# Patient Record
Sex: FEMALE | Race: ASIAN | NOT HISPANIC OR LATINO | Employment: UNEMPLOYED | URBAN - METROPOLITAN AREA
[De-identification: names, ages, dates, MRNs, and addresses within clinical notes are randomized per-mention and may not be internally consistent; named-entity substitution may affect disease eponyms.]

---

## 2023-10-27 ENCOUNTER — HOSPITAL ENCOUNTER (EMERGENCY)
Facility: HOSPITAL | Age: 53
Discharge: HOME | End: 2023-10-28
Attending: EMERGENCY MEDICINE

## 2023-10-27 ENCOUNTER — APPOINTMENT (EMERGENCY)
Dept: CT IMAGING | Facility: HOSPITAL | Age: 53
End: 2023-10-27

## 2023-10-27 VITALS
SYSTOLIC BLOOD PRESSURE: 168 MMHG | RESPIRATION RATE: 22 BRPM | HEART RATE: 72 BPM | TEMPERATURE: 98.2 F | OXYGEN SATURATION: 100 % | DIASTOLIC BLOOD PRESSURE: 84 MMHG | WEIGHT: 221.12 LBS

## 2023-10-27 DIAGNOSIS — R51.9 HEADACHE: ICD-10-CM

## 2023-10-27 DIAGNOSIS — R42 DIZZINESS: ICD-10-CM

## 2023-10-27 DIAGNOSIS — G44.59 COMPLICATED HEADACHE SYNDROMES: Primary | ICD-10-CM

## 2023-10-27 DIAGNOSIS — E03.9 HYPOTHYROID: ICD-10-CM

## 2023-10-27 LAB
ALBUMIN SERPL BCP-MCNC: 4.1 G/DL (ref 3.5–5)
ALP SERPL-CCNC: 64 U/L (ref 34–104)
ALT SERPL W P-5'-P-CCNC: 11 U/L (ref 7–52)
ANION GAP SERPL CALCULATED.3IONS-SCNC: 6 MMOL/L
APTT PPP: 32 SECONDS (ref 23–37)
AST SERPL W P-5'-P-CCNC: 16 U/L (ref 13–39)
BASOPHILS # BLD AUTO: 0.05 THOUSANDS/ÂΜL (ref 0–0.1)
BASOPHILS NFR BLD AUTO: 1 % (ref 0–1)
BILIRUB SERPL-MCNC: 0.35 MG/DL (ref 0.2–1)
BUN SERPL-MCNC: 11 MG/DL (ref 5–25)
CALCIUM SERPL-MCNC: 8.9 MG/DL (ref 8.4–10.2)
CARDIAC TROPONIN I PNL SERPL HS: <2 NG/L
CHLORIDE SERPL-SCNC: 101 MMOL/L (ref 96–108)
CO2 SERPL-SCNC: 30 MMOL/L (ref 21–32)
CREAT SERPL-MCNC: 0.86 MG/DL (ref 0.6–1.3)
EOSINOPHIL # BLD AUTO: 0.14 THOUSAND/ÂΜL (ref 0–0.61)
EOSINOPHIL NFR BLD AUTO: 2 % (ref 0–6)
ERYTHROCYTE [DISTWIDTH] IN BLOOD BY AUTOMATED COUNT: 12.7 % (ref 11.6–15.1)
GFR SERPL CREATININE-BSD FRML MDRD: 76 ML/MIN/1.73SQ M
GLUCOSE SERPL-MCNC: 129 MG/DL (ref 65–140)
HCT VFR BLD AUTO: 34.5 % (ref 34.8–46.1)
HGB BLD-MCNC: 10.9 G/DL (ref 11.5–15.4)
IMM GRANULOCYTES # BLD AUTO: 0.02 THOUSAND/UL (ref 0–0.2)
IMM GRANULOCYTES NFR BLD AUTO: 0 % (ref 0–2)
INR PPP: 0.93 (ref 0.84–1.19)
LYMPHOCYTES # BLD AUTO: 3.13 THOUSANDS/ÂΜL (ref 0.6–4.47)
LYMPHOCYTES NFR BLD AUTO: 38 % (ref 14–44)
MAGNESIUM SERPL-MCNC: 2.1 MG/DL (ref 1.9–2.7)
MCH RBC QN AUTO: 29.5 PG (ref 26.8–34.3)
MCHC RBC AUTO-ENTMCNC: 31.6 G/DL (ref 31.4–37.4)
MCV RBC AUTO: 94 FL (ref 82–98)
MONOCYTES # BLD AUTO: 0.65 THOUSAND/ÂΜL (ref 0.17–1.22)
MONOCYTES NFR BLD AUTO: 8 % (ref 4–12)
NEUTROPHILS # BLD AUTO: 4.24 THOUSANDS/ÂΜL (ref 1.85–7.62)
NEUTS SEG NFR BLD AUTO: 51 % (ref 43–75)
NRBC BLD AUTO-RTO: 0 /100 WBCS
PLATELET # BLD AUTO: 303 THOUSANDS/UL (ref 149–390)
PMV BLD AUTO: 9.9 FL (ref 8.9–12.7)
POTASSIUM SERPL-SCNC: 3.5 MMOL/L (ref 3.5–5.3)
PROT SERPL-MCNC: 7 G/DL (ref 6.4–8.4)
PROTHROMBIN TIME: 13.1 SECONDS (ref 11.6–14.5)
RBC # BLD AUTO: 3.69 MILLION/UL (ref 3.81–5.12)
SODIUM SERPL-SCNC: 137 MMOL/L (ref 135–147)
WBC # BLD AUTO: 8.23 THOUSAND/UL (ref 4.31–10.16)

## 2023-10-27 PROCEDURE — 99285 EMERGENCY DEPT VISIT HI MDM: CPT | Performed by: EMERGENCY MEDICINE

## 2023-10-27 PROCEDURE — 99284 EMERGENCY DEPT VISIT MOD MDM: CPT

## 2023-10-27 PROCEDURE — 84484 ASSAY OF TROPONIN QUANT: CPT

## 2023-10-27 PROCEDURE — 93005 ELECTROCARDIOGRAM TRACING: CPT

## 2023-10-27 PROCEDURE — 83880 ASSAY OF NATRIURETIC PEPTIDE: CPT

## 2023-10-27 PROCEDURE — 36415 COLL VENOUS BLD VENIPUNCTURE: CPT

## 2023-10-27 PROCEDURE — 85025 COMPLETE CBC W/AUTO DIFF WBC: CPT

## 2023-10-27 PROCEDURE — 83735 ASSAY OF MAGNESIUM: CPT

## 2023-10-27 PROCEDURE — 85730 THROMBOPLASTIN TIME PARTIAL: CPT

## 2023-10-27 PROCEDURE — 80053 COMPREHEN METABOLIC PANEL: CPT

## 2023-10-27 PROCEDURE — 81001 URINALYSIS AUTO W/SCOPE: CPT

## 2023-10-27 PROCEDURE — 70498 CT ANGIOGRAPHY NECK: CPT

## 2023-10-27 PROCEDURE — G1004 CDSM NDSC: HCPCS

## 2023-10-27 PROCEDURE — 85610 PROTHROMBIN TIME: CPT

## 2023-10-27 PROCEDURE — 84439 ASSAY OF FREE THYROXINE: CPT

## 2023-10-27 PROCEDURE — 96374 THER/PROPH/DIAG INJ IV PUSH: CPT

## 2023-10-27 PROCEDURE — 84443 ASSAY THYROID STIM HORMONE: CPT

## 2023-10-27 PROCEDURE — 70496 CT ANGIOGRAPHY HEAD: CPT

## 2023-10-27 RX ORDER — ONDANSETRON 2 MG/ML
4 INJECTION INTRAMUSCULAR; INTRAVENOUS ONCE
Status: COMPLETED | OUTPATIENT
Start: 2023-10-27 | End: 2023-10-27

## 2023-10-27 RX ADMIN — IOHEXOL 85 ML: 350 INJECTION, SOLUTION INTRAVENOUS at 23:19

## 2023-10-27 RX ADMIN — ONDANSETRON 4 MG: 2 INJECTION INTRAMUSCULAR; INTRAVENOUS at 23:05

## 2023-10-28 LAB
BACTERIA UR QL AUTO: ABNORMAL /HPF
BILIRUB UR QL STRIP: NEGATIVE
BNP SERPL-MCNC: 11 PG/ML (ref 0–100)
CLARITY UR: CLEAR
COLOR UR: COLORLESS
GLUCOSE UR STRIP-MCNC: NEGATIVE MG/DL
HGB UR QL STRIP.AUTO: NEGATIVE
KETONES UR STRIP-MCNC: NEGATIVE MG/DL
LEUKOCYTE ESTERASE UR QL STRIP: ABNORMAL
NITRITE UR QL STRIP: NEGATIVE
NON-SQ EPI CELLS URNS QL MICRO: ABNORMAL /HPF
PH UR STRIP.AUTO: 7.5 [PH]
PROT UR STRIP-MCNC: NEGATIVE MG/DL
RBC #/AREA URNS AUTO: ABNORMAL /HPF
SP GR UR STRIP.AUTO: 1.02 (ref 1–1.03)
T4 FREE SERPL-MCNC: 0.73 NG/DL (ref 0.61–1.12)
TSH SERPL DL<=0.05 MIU/L-ACNC: 45.53 UIU/ML (ref 0.45–4.5)
UROBILINOGEN UR STRIP-ACNC: <2 MG/DL
WBC #/AREA URNS AUTO: ABNORMAL /HPF

## 2023-10-28 RX ORDER — MECLIZINE HYDROCHLORIDE 25 MG/1
25 TABLET ORAL 3 TIMES DAILY PRN
Qty: 30 TABLET | Refills: 0 | Status: SHIPPED | OUTPATIENT
Start: 2023-10-28

## 2023-10-28 RX ORDER — MECLIZINE HCL 12.5 MG/1
12.5 TABLET ORAL ONCE
Status: COMPLETED | OUTPATIENT
Start: 2023-10-28 | End: 2023-10-28

## 2023-10-28 RX ADMIN — MECLIZINE HYDROCHLORIDE 12.5 MG: 12.5 TABLET ORAL at 00:06

## 2023-10-28 NOTE — ED ATTENDING ATTESTATION
10/27/2023  IDarcie, DO, saw and evaluated the patient. I have discussed the patient with the resident/non-physician practitioner and agree with the resident's/non-physician practitioner's findings, Plan of Care, and MDM as documented in the resident's/non-physician practitioner's note, except where noted. All available labs and Radiology studies were reviewed. I was present for key portions of any procedure(s) performed by the resident/non-physician practitioner and I was immediately available to provide assistance. At this point I agree with the current assessment done in the Emergency Department. I have conducted an independent evaluation of this patient a history and physical is as follows:        49-year-old female presents with sudden onset left-sided headache.,  Says she was laying down resting when she noticed a sudden onset left-sided headache, started 2 hours ago, maximal intensity at time of onset. Similar headaches 7 months ago.,  This occurred in [facility], says she had a work-up but unsure exactly what it was had some sort of imaging unclear if it was MRI or CT.,  No associated nausea no vomiting.,  Currently now still has a mild headache but improved from what it was      Review of Systems   Constitutional: Negative for activity change, chills, diaphoresis and fever. HENT: Negative for congestion, sinus pressure and sore throat. Eyes: Negative for pain and visual disturbance. Respiratory: Negative for cough, chest tightness, shortness of breath, wheezing and stridor. Cardiovascular: Negative for chest pain and palpitations. Gastrointestinal: Negative for abdominal distention, abdominal pain, constipation, diarrhea, nausea and vomiting. Genitourinary: Negative for dysuria and frequency. Musculoskeletal: Negative for neck pain and neck stiffness. Skin: Negative for rash.    Neurological: Negative for dizziness, speech difficulty, light-headedness, numbness positive for headaches. Physical Exam  Vitals reviewed. Constitutional:       General: She is not in acute distress. Appearance: She is well-developed. She is not diaphoretic. HENT:      Head: Normocephalic and atraumatic. Right Ear: External ear normal.      Left Ear: External ear normal.      Nose: Nose normal.   Eyes:      General:         Right eye: No discharge. Left eye: No discharge. Pupils: Pupils are equal, round, and reactive to light. Neck:      Trachea: No tracheal deviation. Cardiovascular:      Rate and Rhythm: Normal rate and regular rhythm. Heart sounds: Normal heart sounds. No murmur heard. Pulmonary:      Effort: Pulmonary effort is normal. No respiratory distress. Breath sounds: Normal breath sounds. No stridor. Abdominal:      General: There is no distension. Palpations: Abdomen is soft. Tenderness: There is no abdominal tenderness. There is no guarding or rebound. Musculoskeletal:         General: Normal range of motion. Cervical back: Normal range of motion and neck supple. Skin:     General: Skin is warm and dry. Coloration: Skin is not pale. Findings: No erythema. Neurological:      General: No focal deficit present. Mental Status: She is alert and oriented to person, place, and time. ED Course         Critical Care Time  Procedures      MDM  Number of Diagnoses or Management Options  Complicated headache syndromes  Dizziness  Headache  Hypothyroid  Diagnosis management comments:       Initial ED assessment:     58-year-old female sudden onset headache.     Initial DDx includes but is not limited to:   Subarachnoid hemorrhage, migraine headache, tension headache,    Initial ED plan:   Blood work, CT head        Final ED summary/disposition:   After evaluation and workup in the emergency department, patient with an elevated TSH known hypothyroidism likely needs adjustment of her thyroid medication, most of her care has been in [facility], will arrange with family doctor is here             Time reflects when diagnosis was documented in both MDM as applicable and the Disposition within this note       Time User Action Codes Description Comment    10/28/2023  1:09 AM Pavan Paul Add [R51.9] Headache     10/28/2023  1:09 AM Pavan Paul Add [E03.9] Hypothyroid     10/28/2023  1:09 AM Pavan Paul Add [R42] Dizziness     10/28/2023  1:54 AM Armando BLANCO Add [F21.39] Complicated headache syndromes     10/28/2023  1:54 AM Chandrika Mosley Modify [R51.9] Headache     10/28/2023  1:54 AM Chandrika Mosley Modify [C43.21] Complicated headache syndromes           ED Disposition       ED Disposition   Discharge    Condition   Stable    Date/Time   Sat Oct 28, 2023  1:09 AM    Comment   Diane Perkins discharge to home/self care.                    Follow-up Information       Follow up With Specialties Details Why Contact Info Additional Information    [facility] Schedule an appointment as soon as possible for a visit   [address] [place] [phone]  [address] [place] [address] Radha Mart[address]   [phone]

## 2023-10-28 NOTE — ED PROVIDER NOTES
History  Chief Complaint   Patient presents with    Headache     Pt reports sudden onset of 10/10 HA and blurred vision around 10pm. Pain is in temples and radiates into jaw. Denies CP/SOB. Reports upper abdominal pain. Denies urinary problems      57-year-old female history of high blood pressure and hypothyroidism presenting with sudden severe headache. Started about 1.5 hours ago with 10/10 headache with sudden severe intensity. Pain behind eyes, back of head, and in the jaw. Some nausea without vomiting. Slight shortness of breath. Has had a similar headache before, about 8 months ago, evaluated in [facility] with a head scan which was apparently negative. No neuro sxs, cp, sob, vomiting. Has not tried anything to make feel better. HPI and PE limited by language barrier.  used: Yes ([facility]  Benjie [id number] and pt's friend)    Headache  Associated symptoms: no abdominal pain, no cough, no diarrhea, no fever, no nausea and no vomiting        None       No past medical history on file. No past surgical history on file. No family history on file. I have reviewed and agree with the history as documented. No existing history information found. No existing history information found. Review of Systems   Constitutional:  Negative for activity change, fever and unexpected weight change. Respiratory:  Negative for cough, chest tightness and shortness of breath. Cardiovascular:  Negative for chest pain and palpitations. Gastrointestinal:  Negative for abdominal pain, diarrhea, nausea and vomiting. Genitourinary:  Negative for dysuria and hematuria. Skin:  Negative for wound. Allergic/Immunologic: Negative for immunocompromised state. Neurological:  Positive for headaches. Negative for syncope. All other systems reviewed and are negative.       Physical Exam  ED Triage Vitals   Temperature Pulse Respirations Blood Pressure SpO2   10/27/23 2240 10/27/23 2238 10/27/23 2238 10/27/23 2238 10/27/23 2238   98.2 °F (36.8 °C) 72 22 168/84 100 %      Temp Source Heart Rate Source Patient Position - Orthostatic VS BP Location FiO2 (%)   10/27/23 2240 10/27/23 2238 -- -- --   Oral Monitor         Pain Score       10/27/23 2238       10 - Worst Possible Pain             Orthostatic Vital Signs  Vitals:    10/27/23 2238   BP: 168/84   Pulse: 72       Physical Exam  Vitals and nursing note reviewed. Constitutional:       General: She is in acute distress. Appearance: She is not toxic-appearing or diaphoretic. HENT:      Head: Normocephalic and atraumatic. Right Ear: External ear normal.      Left Ear: External ear normal.      Nose: Nose normal.      Mouth/Throat:      Mouth: Mucous membranes are moist.   Eyes:      General: No scleral icterus. Extraocular Movements: Extraocular movements intact. Conjunctiva/sclera: Conjunctivae normal.   Cardiovascular:      Rate and Rhythm: Normal rate and regular rhythm. Pulses: Normal pulses. Radial pulses are 2+ on the right side. Dorsalis pedis pulses are 2+ on the right side and 2+ on the left side. Heart sounds: Normal heart sounds, S1 normal and S2 normal. No murmur heard. Pulmonary:      Effort: Pulmonary effort is normal. No respiratory distress. Breath sounds: Normal breath sounds. No stridor. No wheezing. Abdominal:      Palpations: Abdomen is soft. Tenderness: There is no abdominal tenderness. Musculoskeletal:         General: Normal range of motion. Cervical back: Normal range of motion. No rigidity. Skin:     General: Skin is warm and dry. Neurological:      General: No focal deficit present. Mental Status: She is alert and oriented to person, place, and time. Cranial Nerves: No facial asymmetry. Motor: Motor function is intact. No weakness or tremor.       Coordination: Finger-Nose-Finger Test and Heel to Aguilar Test normal. Rapid alternating movements normal.      Gait: Gait is intact. Psychiatric:         Mood and Affect: Mood normal.         ED Medications  Medications   ondansetron (ZOFRAN) injection 4 mg (4 mg Intravenous Given 10/27/23 2305)   iohexol (OMNIPAQUE) 350 MG/ML injection (MULTI-DOSE) 85 mL (85 mL Intravenous Given 10/27/23 2319)   meclizine (ANTIVERT) tablet 12.5 mg (12.5 mg Oral Given 10/28/23 0006)       Diagnostic Studies  Results Reviewed       Procedure Component Value Units Date/Time    Urine Microscopic [[id number]]  (Abnormal) Collected: 10/27/23 2355    Lab Status: Final result Specimen: Urine, Clean Catch Updated: 10/28/23 0015     RBC, UA None Seen /hpf      WBC, UA 4-10 /hpf      Epithelial Cells None Seen /hpf      Bacteria, UA None Seen /hpf     B-Type Natriuretic Peptide(BNP) [[id number]]  (Normal) Collected: 10/27/23 2306    Lab Status: Final result Specimen: Blood from Arm, Right Updated: 10/28/23 0012     BNP 11 pg/mL     TSH, 3rd generation with Free T4 reflex [[id number]]  (Abnormal) Collected: 10/27/23 2306    Lab Status: Final result Specimen: Blood from Arm, Right Updated: 10/28/23 0011     TSH 3RD GENERATON 45.529 uIU/mL     T4, free [[id number]] Collected: 10/27/23 2306    Lab Status:  In process Specimen: Blood from Arm, Right Updated: 10/28/23 0011    UA w Reflex to Microscopic w Reflex to Culture [[id number]]  (Abnormal) Collected: 10/27/23 2355    Lab Status: Final result Specimen: Urine, Clean Catch Updated: 10/28/23 0002     Color, UA Colorless     Clarity, UA Clear     Specific Gravity, UA 1.025     pH, UA 7.5     Leukocytes, UA Small     Nitrite, UA Negative     Protein, UA Negative mg/dl      Glucose, UA Negative mg/dl      Ketones, UA Negative mg/dl      Urobilinogen, UA <2.0 mg/dl      Bilirubin, UA Negative     Occult Blood, UA Negative    HS Troponin 0hr (reflex protocol) [[id number]]  (Normal) Collected: 10/27/23 2306    Lab Status: Final result Specimen: Blood from Arm, Right Updated: 10/27/23 2345 hs TnI 0hr <2 ng/L     Comprehensive metabolic panel [[id number]] Collected: 10/27/23 2306    Lab Status: Final result Specimen: Blood from Arm, Right Updated: 10/27/23 2336     Sodium 137 mmol/L      Potassium 3.5 mmol/L      Chloride 101 mmol/L      CO2 30 mmol/L      ANION GAP 6 mmol/L      BUN 11 mg/dL      Creatinine 0.86 mg/dL      Glucose 129 mg/dL      Calcium 8.9 mg/dL      AST 16 U/L      ALT 11 U/L      Alkaline Phosphatase 64 U/L      Total Protein 7.0 g/dL      Albumin 4.1 g/dL      Total Bilirubin 0.35 mg/dL      eGFR 76 ml/min/1.73sq m     Narrative:      [place] guidelines for Chronic Kidney Disease (CKD):     Stage 1 with normal or high GFR (GFR > 90 mL/min/1.73 square meters)    Stage 2 Mild CKD (GFR = 60-89 mL/min/1.73 square meters)    Stage 3A Moderate CKD (GFR = 45-59 mL/min/1.73 square meters)    Stage 3B Moderate CKD (GFR = 30-44 mL/min/1.73 square meters)    Stage 4 Severe CKD (GFR = 15-29 mL/min/1.73 square meters)    Stage 5 End Stage CKD (GFR <15 mL/min/1.73 square meters)  Note: GFR calculation is accurate only with a steady state creatinine    Magnesium [[id number]]  (Normal) Collected: 10/27/23 2306    Lab Status: Final result Specimen: Blood from Arm, Right Updated: 10/27/23 2336     Magnesium 2.1 mg/dL     Protime-INR [[id number]]  (Normal) Collected: 10/27/23 2306    Lab Status: Final result Specimen: Blood from Arm, Right Updated: 10/27/23 2332     Protime 13.1 seconds      INR 0.93    APTT [[id number]]  (Normal) Collected: 10/27/23 2306    Lab Status: Final result Specimen: Blood from Arm, Right Updated: 10/27/23 2332     PTT 32 seconds     CBC and differential [[id number]]  (Abnormal) Collected: 10/27/23 2306    Lab Status: Final result Specimen: Blood from Arm, Right Updated: 10/27/23 2316     WBC 8.23 Thousand/uL      RBC 3.69 Million/uL      Hemoglobin 10.9 g/dL      Hematocrit 34.5 %      MCV 94 fL      MCH 29.5 pg      MCHC 31.6 g/dL      RDW 12.7 % MPV 9.9 fL      Platelets 456 Thousands/uL      nRBC 0 /100 WBCs      Neutrophils Relative 51 %      Immat GRANS % 0 %      Lymphocytes Relative 38 %      Monocytes Relative 8 %      Eosinophils Relative 2 %      Basophils Relative 1 %      Neutrophils Absolute 4.24 Thousands/µL      Immature Grans Absolute 0.02 Thousand/uL      Lymphocytes Absolute 3.13 Thousands/µL      Monocytes Absolute 0.65 Thousand/µL      Eosinophils Absolute 0.14 Thousand/µL      Basophils Absolute 0.05 Thousands/µL                    CTA head and neck with and without contrast   Final Result by Korey Ferrer MD (10/28 4912)      No evidence of acute intracranial abnormality. No evidence of large vessel occlusion. No evidence of large vessel flow restrictive disease within the head or neck. Anatomic variations, as described above. Workstation performed: [id number]               Procedures  ECG 12 Lead Documentation Only    Date/Time: 10/28/2023 1:49 AM    Performed by: Javon Henderson MD  Authorized by: Javon Henderson MD    ECG reviewed by me, the ED Provider: yes    Patient location:  ED  Previous ECG:     Previous ECG:  Unavailable  Interpretation:     Interpretation: normal    Rate:     ECG rate:  67    ECG rate assessment: normal    Rhythm:     Rhythm: sinus rhythm    Ectopy:     Ectopy: none    QRS:     QRS axis:  Normal    QRS intervals:  Normal  Conduction:     Conduction: normal    ST segments:     ST segments:  Normal  T waves:     T waves: normal          ED Course  ED Course as of 10/28/23 0357   Fri Oct 27, 2023   2330 CBC and differential(!)  wnl   2339 Comprehensive metabolic panel  wnl   Sat Oct 28, 2023   0000 hs TnI 0hr: <2   0005 UA w Reflex to Microscopic w Reflex to Culture(!)  No uti   0014 TSH 3RD GENERATON(!): 45.529   0017 Urine Microscopic(!)  No uti   0042 CTA head and neck with and without contrast  No evidence of acute intracranial abnormality.      No evidence of large vessel occlusion. No evidence of large vessel flow restrictive disease within the head or neck. Anatomic variations, as described above. HEART score:    History 0=Slightly or non-suspicious   ECG 0=Normal   Age 1= > 45 - <65 years   Risk Factors 1= 1 or 2 risk factors   Troponin 0= Less than or equal to 12 ng/L   Total 2                                      Medical Decision Making  Initial Impression: Given sudden severe headache, worried about the possibility of subarachnoid hemorrhage. Although patient recounts having a similar headache a few months ago, some possibility that that could have been a herald bleed. No neurodeficits so do not suspect stroke. No fever or nuchal rigidity so do not suspect meningitis. Could be tension type headache or migraine. Will go directly to CTA to evaluate for the possibility of subarachnoid hemorrhage/aneurysm. We will also do an evaluation looking for signs of infection electrolyte abnormalities. Although not likely be related, will test her TSH as it does not seem like she gets regular evaluations of this      Final Impression: CTA head/neck was negative for bleed or any other acute pathology. Signs of acute infection. Patient was given Antivert for dizziness which did help her symptoms. The time the CTA was read, went to reevaluate patient who was feeling much better and was okay being discharged home. Did review with her and son the elevated TSH and spent a long time explaining the importance of follow-up and that she likely needs to increase her levothyroxine. They explained that patient goes back and forth between here in [facility] every few months which makes it hard to coordinate care. Provided patient with a referral to our family medicine clinic who can help reassess her current levothyroxine dose and see what changes need to be made. Since it helped her so much tonight, did provide her a short prescription for Antivert. Patient was able to ambulate. Patient was discharged in good condition. Problems Addressed:  Complicated headache syndromes: acute illness or injury  Dizziness: acute illness or injury  Headache: acute illness or injury  Hypothyroid: chronic illness or injury with exacerbation, progression, or side effects of treatment    Amount and/or Complexity of Data Reviewed  Independent Historian: caregiver  Labs: ordered. Decision-making details documented in ED Course. Radiology: ordered. Decision-making details documented in ED Course. ECG/medicine tests: ordered and independent interpretation performed. Risk  Prescription drug management. Risk Details: Evaluated patient for subarachnoid hemorrhage, stroke, ACS, electrolyte abnormality, arrhythmia, hypothyroidism, hyperthyroidism          Disposition  Final diagnoses:   Headache   Hypothyroid   Dizziness   Complicated headache syndromes     Time reflects when diagnosis was documented in both MDM as applicable and the Disposition within this note       Time User Action Codes Description Comment    10/28/2023  1:09 AM Elias Hoose Add [R51.9] Headache     10/28/2023  1:09 AM Elias Hoose Add [E03.9] Hypothyroid     10/28/2023  1:09 AM Elias Hoose Add [R42] Dizziness     10/28/2023  1:54 AM Andres BLANCO Add [O16.52] Complicated headache syndromes     10/28/2023  1:54 AM Marnee Girt Modify [R51.9] Headache     10/28/2023  1:54 AM Marnee Girt Modify [U24.71] Complicated headache syndromes           ED Disposition       ED Disposition   Discharge    Condition   Stable    Date/Time   Sat Oct 28, 2023  1:09 AM    Comment   Alana Florez discharge to home/self care.                    Follow-up Information       Follow up With Specialties Details Why Contact Info Additional [address] Family [facility] Schedule an appointment as soon as possible for a visit   [address] [place] [phone]  [facility], [address] [place] [address]), [address] [place] [address]   [phone]            Discharge Medication List as of 10/28/2023  1:10 AM        START taking these medications    Details   meclizine (ANTIVERT) 25 mg tablet Take 1 tablet (25 mg total) by mouth 3 (three) times a day as needed for dizziness, Starting Sat 10/28/2023, Normal               PDMP Review       None             ED Provider  Attending physically available and evaluated Deni Barton. I managed the patient along with the ED Attending.     Electronically Signed by           Ava Morley MD  10/28/23 1111

## 2023-10-29 LAB
ATRIAL RATE: 67 BPM
P AXIS: 58 DEGREES
PR INTERVAL: 156 MS
QRS AXIS: 41 DEGREES
QRSD INTERVAL: 98 MS
QT INTERVAL: 420 MS
QTC INTERVAL: 443 MS
T WAVE AXIS: 50 DEGREES
VENTRICULAR RATE: 67 BPM

## 2023-10-29 PROCEDURE — 93010 ELECTROCARDIOGRAM REPORT: CPT | Performed by: INTERNAL MEDICINE

## 2025-03-04 ENCOUNTER — TELEPHONE (OUTPATIENT)
Dept: OTHER | Facility: OTHER | Age: 55
End: 2025-03-04

## 2025-03-05 NOTE — TELEPHONE ENCOUNTER
Progress Note:  Left message for patient to return call for assistance with making appointments or getting access to medical care.     Mammogram Screening ([facility]):  Pap smear screening (Clinic vs [facility] vs [facility]):  PCP ([facility]inic vs [facility] vs [facility]):     Transportation:     Education: